# Patient Record
Sex: MALE | Race: WHITE | NOT HISPANIC OR LATINO | URBAN - NONMETROPOLITAN AREA
[De-identification: names, ages, dates, MRNs, and addresses within clinical notes are randomized per-mention and may not be internally consistent; named-entity substitution may affect disease eponyms.]

---

## 2019-01-29 ENCOUNTER — IMPORTED ENCOUNTER (OUTPATIENT)
Dept: URBAN - NONMETROPOLITAN AREA CLINIC 1 | Facility: CLINIC | Age: 79
End: 2019-01-29

## 2019-01-29 PROBLEM — H26.491: Noted: 2019-01-29

## 2019-01-29 PROBLEM — Z96.1: Noted: 2019-01-29

## 2019-01-29 PROBLEM — H18.51: Noted: 2019-01-29

## 2019-01-29 PROCEDURE — 99213 OFFICE O/P EST LOW 20 MIN: CPT

## 2019-01-29 NOTE — PATIENT DISCUSSION
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 Will give new GLRx and have patient take to the [place] and request a remake at n/c. If they will not do it I have asked that the patient return here so we can remake his left lens. Pt agreed with plan. - Trace PCO noted but not visually significant.  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

## 2022-04-09 ASSESSMENT — TONOMETRY
OD_IOP_MMHG: 14
OS_IOP_MMHG: 14

## 2022-04-09 ASSESSMENT — VISUAL ACUITY
OS_SC: 20/60
OS_CC: 20/30-2
OD_SC: 20/20-